# Patient Record
Sex: MALE | Race: WHITE | NOT HISPANIC OR LATINO | URBAN - METROPOLITAN AREA
[De-identification: names, ages, dates, MRNs, and addresses within clinical notes are randomized per-mention and may not be internally consistent; named-entity substitution may affect disease eponyms.]

---

## 2021-07-20 ENCOUNTER — TELEPHONE ENCOUNTER (OUTPATIENT)
Dept: URBAN - METROPOLITAN AREA CLINIC 92 | Facility: CLINIC | Age: 41
End: 2021-07-20

## 2021-07-20 ENCOUNTER — OFFICE VISIT (OUTPATIENT)
Dept: URBAN - METROPOLITAN AREA TELEHEALTH 2 | Facility: TELEHEALTH | Age: 41
End: 2021-07-20
Payer: COMMERCIAL

## 2021-07-20 ENCOUNTER — LAB OUTSIDE AN ENCOUNTER (OUTPATIENT)
Dept: URBAN - METROPOLITAN AREA TELEHEALTH 2 | Facility: TELEHEALTH | Age: 41
End: 2021-07-20

## 2021-07-20 DIAGNOSIS — R10.13 EPIGASTRIC PAIN: ICD-10-CM

## 2021-07-20 DIAGNOSIS — K80.20 GALLSTONES: ICD-10-CM

## 2021-07-20 DIAGNOSIS — K21.9 GERD WITHOUT ESOPHAGITIS: ICD-10-CM

## 2021-07-20 PROCEDURE — 99204 OFFICE O/P NEW MOD 45 MIN: CPT | Performed by: INTERNAL MEDICINE

## 2021-07-20 RX ORDER — PANTOPRAZOLE SODIUM 40 MG/1
1 TABLET TABLET, DELAYED RELEASE ORAL ONCE A DAY
Qty: 90 | Refills: 3 | OUTPATIENT
Start: 2021-07-20

## 2021-07-20 RX ORDER — PANTOPRAZOLE SODIUM 40 MG/1
1 TABLET TABLET, DELAYED RELEASE ORAL ONCE A DAY
Status: ACTIVE | COMMUNITY

## 2021-07-20 NOTE — HPI-TODAY'S VISIT:
39 yo CM here for symptoms of epigastric abdominal pain associated with chest pain. went to ER recently for it. labs were normal. LFTs-normal. noted to have small gallstones on u/s. has had long standing issues with anxiety. has fear of vomiting. thus, eats very poorly. lost 40 pounds over this past year. EGd from 2003-normal. on protonix. seems to help. long standing GERD. afraid of doctors, so has not had it addressed

## 2021-07-28 ENCOUNTER — OFFICE VISIT (OUTPATIENT)
Dept: URBAN - METROPOLITAN AREA CLINIC 128 | Facility: CLINIC | Age: 41
End: 2021-07-28

## 2021-08-16 ENCOUNTER — TELEPHONE ENCOUNTER (OUTPATIENT)
Dept: URBAN - METROPOLITAN AREA CLINIC 40 | Facility: CLINIC | Age: 41
End: 2021-08-16

## 2021-08-31 ENCOUNTER — OFFICE VISIT (OUTPATIENT)
Dept: URBAN - METROPOLITAN AREA SURGERY CENTER 30 | Facility: SURGERY CENTER | Age: 41
End: 2021-08-31
Payer: COMMERCIAL

## 2021-08-31 DIAGNOSIS — K29.30 CHRONIC SUPERFICIAL GASTRITIS: ICD-10-CM

## 2021-08-31 DIAGNOSIS — K22.8 COLUMNAR-LINED ESOPHAGUS: ICD-10-CM

## 2021-08-31 PROCEDURE — G8907 PT DOC NO EVENTS ON DISCHARG: HCPCS | Performed by: INTERNAL MEDICINE

## 2021-08-31 PROCEDURE — 43239 EGD BIOPSY SINGLE/MULTIPLE: CPT | Performed by: INTERNAL MEDICINE

## 2021-10-04 ENCOUNTER — OFFICE VISIT (OUTPATIENT)
Dept: URBAN - METROPOLITAN AREA TELEHEALTH 2 | Facility: TELEHEALTH | Age: 41
End: 2021-10-04
Payer: COMMERCIAL

## 2021-10-04 VITALS — WEIGHT: 140 LBS | HEIGHT: 69 IN | BODY MASS INDEX: 20.73 KG/M2

## 2021-10-04 DIAGNOSIS — K21.9 GERD WITHOUT ESOPHAGITIS: ICD-10-CM

## 2021-10-04 DIAGNOSIS — K80.20 GALLSTONES: ICD-10-CM

## 2021-10-04 DIAGNOSIS — R10.13 EPIGASTRIC PAIN: ICD-10-CM

## 2021-10-04 PROCEDURE — 99213 OFFICE O/P EST LOW 20 MIN: CPT | Performed by: INTERNAL MEDICINE

## 2021-10-04 RX ORDER — PANTOPRAZOLE SODIUM 40 MG/1
1 TABLET TABLET, DELAYED RELEASE ORAL ONCE A DAY
Qty: 90 | Refills: 3 | Status: ACTIVE | COMMUNITY
Start: 2021-07-20

## 2021-10-04 RX ORDER — PANTOPRAZOLE SODIUM 40 MG/1
1 TABLET TABLET, DELAYED RELEASE ORAL ONCE A DAY
Status: DISCONTINUED | COMMUNITY

## 2021-10-04 RX ORDER — PANTOPRAZOLE SODIUM 40 MG/1
1 TABLET TABLET, DELAYED RELEASE ORAL ONCE A DAY
OUTPATIENT
Start: 2021-07-20

## 2021-10-04 NOTE — PHYSICAL EXAM HENT:
Head , normocephalic , atraumatic , Face , Face within normal limits , Ears , External ears within normal limits , Nose/Nasopharynx , External nose  normal appearance , Mouth and Throat , Oral cavity clear, poor dentition.

## 2021-10-04 NOTE — HPI-TODAY'S VISIT:
Mr. Lilly is a 42 yo White male here for symptoms of epigastric abdominal pain associated with chest pain. went to ER recently for it. labs were normal. LFTs-normal. noted to have small gallstones on u/s. has had long standing issues with anxiety and fear of vomiting-this has been going on >20 years. Thus, he eats small portions of food throughout the day and lost 40 pounds over this past year. An EGD from 2003-normal. He is on protonix and this seems to help. Long standing GERD. afraid of doctors, so has not had it addressed. Plan EGD with Dr. Remy on August 31, 2021 reason to have LA grade a esophagitis, diffuse mild gastritis.  Duodenum appeared normal.  Per pathology, biopsies from the stomach noted mild, chronic inflammation and reactive changes.  No H. pylori.  Esophageal biopsies with reflux changes.  No intestinal metaplasia, fungus or dysplasia. Admits to drinking a lot of Sprite, occasional Pepsi. Still dipping (tobacco-free) after quitting years ago. Do not drink alcohol, not since >10 years ago. States he cannot drink water since it is "flavorless" and cannot sleep lying down. Concerned with medications which will affect mood as he tends to "research medications once prescribed" and if N/V listed as an AE, he will not take medication.

## 2022-02-04 ENCOUNTER — DASHBOARD ENCOUNTERS (OUTPATIENT)
Age: 42
End: 2022-02-04

## 2022-02-04 ENCOUNTER — OFFICE VISIT (OUTPATIENT)
Dept: URBAN - METROPOLITAN AREA TELEHEALTH 2 | Facility: TELEHEALTH | Age: 42
End: 2022-02-04
Payer: COMMERCIAL

## 2022-02-04 DIAGNOSIS — R10.13 EPIGASTRIC PAIN: ICD-10-CM

## 2022-02-04 DIAGNOSIS — K21.9 GERD WITHOUT ESOPHAGITIS: ICD-10-CM

## 2022-02-04 DIAGNOSIS — K80.20 GALLSTONES: ICD-10-CM

## 2022-02-04 PROBLEM — 79922009: Status: ACTIVE | Noted: 2021-07-20

## 2022-02-04 PROBLEM — 266435005: Status: ACTIVE | Noted: 2021-07-20

## 2022-02-04 PROBLEM — 235919008: Status: ACTIVE | Noted: 2021-07-20

## 2022-02-04 PROCEDURE — 99213 OFFICE O/P EST LOW 20 MIN: CPT | Performed by: INTERNAL MEDICINE

## 2022-02-04 RX ORDER — PANTOPRAZOLE SODIUM 40 MG/1
1 TABLET TABLET, DELAYED RELEASE ORAL ONCE A DAY
Status: ACTIVE | COMMUNITY
Start: 2021-07-20

## 2022-02-04 RX ORDER — PANTOPRAZOLE SODIUM 40 MG/1
1 TABLET TABLET, DELAYED RELEASE ORAL ONCE A DAY
OUTPATIENT
Start: 2021-07-20

## 2022-02-04 NOTE — HPI-TODAY'S VISIT:
Mr. Lilly is a 42 yo White male seen today for telehealth f/u, with history of dyspepsia, GERD. He used to have symptoms of epigastric abdominal pain associated with chest pain and has been to ER recently for it. His labs were normal. LFTs-normal, but he was noted to have small gallstones on u/s. He has had long standing issues with anxiety and fear of vomiting-this has been going on >20 years. Thus, he eats small portions of food throughout the day and lost 40 pounds over this past year. An EGD from 2003-normal. He is on protonix and this seems to help. Long standing GERD. Admits he afraid of doctors, so has not had it addressed. Plan EGD with Dr. Remy on August 31, 2021 reason to have LA grade a esophagitis, diffuse mild gastritis.  Duodenum appeared normal.  Per pathology, biopsies from the stomach noted mild, chronic inflammation and reactive changes.  No H. pylori.  Esophageal biopsies with reflux changes.  No intestinal metaplasia, fungus or dysplasia. Admits to drinking a lot of Sprite, occasional Pepsi. Still dipping (tobacco-free) after quitting years ago. Do not drink alcohol, not since >10 years ago. States he cannot drink water since it is "flavorless" and cannot sleep lying down. Concerned with medications which will affect mood as he tends to "research medications once prescribed" and if N/V listed as an AE, he will not take medication. Rare heartburn nowadays with daily pantoprazole. Flare weeks ago, but may have been stress related to pet's surgery.

## 2022-08-22 ENCOUNTER — TELEPHONE ENCOUNTER (OUTPATIENT)
Dept: URBAN - METROPOLITAN AREA CLINIC 40 | Facility: CLINIC | Age: 42
End: 2022-08-22

## 2022-08-22 RX ORDER — PANTOPRAZOLE SODIUM 40 MG/1
1 TABLET TABLET, DELAYED RELEASE ORAL ONCE A DAY
Qty: 30 TABLET | Refills: 2
Start: 2021-07-20

## 2022-09-26 ENCOUNTER — ERX REFILL RESPONSE (OUTPATIENT)
Dept: URBAN - METROPOLITAN AREA CLINIC 40 | Facility: CLINIC | Age: 42
End: 2022-09-26

## 2022-09-26 RX ORDER — PANTOPRAZOLE SODIUM 40 MG/1
TAKE 1 TABLET BY MOUTH EVERY DAY FOR 30 DAYS TABLET, DELAYED RELEASE ORAL
Qty: 30 TABLET | Refills: 2 | OUTPATIENT

## 2022-09-26 RX ORDER — PANTOPRAZOLE SODIUM 40 MG/1
1 TABLET TABLET, DELAYED RELEASE ORAL ONCE A DAY
Qty: 30 TABLET | Refills: 2 | OUTPATIENT

## 2022-12-05 ENCOUNTER — ERX REFILL RESPONSE (OUTPATIENT)
Dept: URBAN - METROPOLITAN AREA CLINIC 40 | Facility: CLINIC | Age: 42
End: 2022-12-05

## 2022-12-05 RX ORDER — PANTOPRAZOLE SODIUM 40 MG/1
TAKE 1 TABLET BY MOUTH EVERY DAY TABLET, DELAYED RELEASE ORAL
Qty: 90 TABLET | Refills: 0 | OUTPATIENT

## 2022-12-05 RX ORDER — PANTOPRAZOLE SODIUM 40 MG/1
TAKE 1 TABLET BY MOUTH EVERY DAY FOR 30 DAYS TABLET, DELAYED RELEASE ORAL
Qty: 30 TABLET | Refills: 2 | OUTPATIENT

## 2023-04-10 ENCOUNTER — ERX REFILL RESPONSE (OUTPATIENT)
Dept: URBAN - METROPOLITAN AREA CLINIC 40 | Facility: CLINIC | Age: 43
End: 2023-04-10

## 2023-04-10 RX ORDER — PANTOPRAZOLE SODIUM 40 MG/1
TAKE 1 TABLET BY MOUTH EVERY DAY TABLET, DELAYED RELEASE ORAL
Qty: 90 TABLET | Refills: 0 | OUTPATIENT